# Patient Record
(demographics unavailable — no encounter records)

---

## 2017-04-20 NOTE — XRAY REPORT
LEFT TOES:



History: Fifth toe pain.



The bony architecture is intact.  Bony alignment is normal.

No soft tissue abnormalities are seen.  The joint spaces appear

preserved.



IMPRESSION:

Normal left toes.

## 2017-04-21 NOTE — EMERGENCY DEPARTMENT REPORT
Entered by JYOTI ORLANDO, acting as scribe for EDDIE MURRAY NP.





ED Lower Extremity HPI





- General


Chief Complaint: Extremity Injury, Lower


Stated Complaint: PAIN IN TOES


Source: patient


Mode of arrival: Ambulatory


Limitations: No Limitations





- History of Present Illness


Initial Comments: 


36 year old female with no significant PMHx presents to the ED c/o of left 

pinky toe pain that began this morning. Patient states that she was walking and 

subsequently kicked her toe straight against a coffee table. Rates pain a 10/10 

in severity. Pain is worsened with standing and walking. Denies loss of 

consciousness, numbness, tingling, chest pain, SOB, and nausea. Patient was 

brought to the ED by her sister. LMP 11/24/2016. ATA SOLOMON Complaint: foot injury (straight left pinky toe impact against a coffee table

)


-: This morning


Injury: Toes: Left (pinky toes)


Type of Injury: other (straight left toe impact against a coffee table)


Place: home


Severity: moderate


Severity scale (0 -10): 10


Improves With: immobilization


Worsens With: weight bearing (standing)


Context: walking (and kicked toes against coffe table)


Associated Symptoms: able to partially bear weight, ambulatory.  denies: snap/

pop sensation, swelling, numbness, tingling





- Related Data


 Previous Rx's











 Medication  Instructions  Recorded  Last Taken  Type


 


HYDROcodone/APAP 5-325 [[place] 1 each PO Q6HR PRN #30 tablet 08/14/16 Unknown Rx





5/325]    


 


Ibuprofen [Motrin] 800 mg PO Q8HR PRN #60 tablet 08/14/16 Unknown Rx


 


traMADol [Ultram] 50 mg PO Q4HR PRN 5 Days 04/20/17 Unknown Rx











 Allergies











Allergy/AdvReac Type Severity Reaction Status Date / Time


 


No Known Allergies Allergy   Verified 05/02/16 16:13














ED Review of Systems


Comment: All other systems reviewed and negative


Constitutional: denies: chills, weakness (generalized), other (tingling and 

loss of consciousness)


Eyes: denies: eye pain, eye discharge, vision change


ENT: denies: ear pain, throat pain


Respiratory: denies: orthopnea, shortness of breath, SOB with exertion, SOB at 

rest


Cardiovascular: denies: chest pain


Gastrointestinal: denies: nausea


Musculoskeletal: other (left pinky toe pain)


Skin: denies: rash, lesions


Neurological: denies: numbness


Psychiatric: denies: anxiety, depression





ED Past Medical Hx





- Past Medical History


Previous Medical History?: Yes


Hx Hypertension: No


Hx Congestive Heart Failure: No


Hx Diabetes: No


Hx Deep Vein Thrombosis: No


Hx Renal Disease: No


Hx Sickle Cell Disease: No


Hx Seizures: No


Hx Asthma: No


Hx COPD: No


Hx HIV: No


Additional medical history: ovarian cyst





- Surgical History


Past Surgical History?: Yes


Additional Surgical History: hernia surg





- Social History


Smoking Status: Never Smoker


Substance Use Type: None





- Medications


Home Medications: 


 Home Medications











 Medication  Instructions  Recorded  Confirmed  Last Taken  Type


 


HYDROcodone/APAP 5-325 [[place] 1 each PO Q6HR PRN #30 tablet 08/14/16  Unknown Rx





5/325]     


 


Ibuprofen [Motrin] 800 mg PO Q8HR PRN #60 tablet 08/14/16  Unknown Rx


 


traMADol [Ultram] 50 mg PO Q4HR PRN 5 Days 04/20/17  Unknown Rx














ED Physical Exam





- General


Limitations: No Limitations


General appearance: alert, in no apparent distress





- Head


Head exam: Present: atraumatic, normocephalic





- Eye


Eye exam: Present: normal appearance, EOMI


Pupils: Present: normal accommodation





- ENT


ENT exam: Present: normal exam, mucous membranes moist, TM's normal bilaterally





- Neck


Neck exam: Present: normal inspection, full ROM





- Respiratory


Respiratory exam: Present: normal lung sounds bilaterally.  Absent: respiratory 

distress





- Cardiovascular


Cardiovascular Exam: Present: regular rate, normal rhythm





- GI/Abdominal


GI/Abdominal exam: Present: soft.  Absent: distended





- Extremities Exam


Extremities exam: Present: normal inspection, full ROM, tenderness (left pinky 

toe), normal capillary refill.  Absent: pedal edema, joint swelling (left pinky 

toe and left lateral foot)





- Expanded Lower Extremity Exam


  ** Left


Hip exam: Present: normal inspection, full ROM.  Absent: tenderness


Upper Leg exam: Present: normal inspection, full ROM.  Absent: tenderness


Knee exam: Present: normal inspection, full ROM.  Absent: tenderness


Lower Leg exam: Present: normal inspection, full ROM.  Absent: tenderness


Ankle exam: Present: normal inspection, full ROM.  Absent: tenderness


Foot/Toe exam: Present: normal inspection, full ROM, tenderness (left pinky toe)

.  Absent: swelling, abrasion, laceration, ecchymosis, deformity, erythema


Neuro vascular tendon exam: Present: no vascular compromise.  Absent: pulse 

deficit, abnormal cap refill, motor deficit, sensory deficit, tendon deficit, 

pallor


Gait: Positive: antalgic





- Back Exam


Back exam: Present: normal inspection, full ROM





- Neurological Exam


Neurological exam: Present: alert, oriented X3





- Psychiatric


Psychiatric exam: Present: normal affect, normal mood





- Skin


Skin exam: Present: warm, dry, intact.  Absent: rash, erythema (left pinky toe)

, ecchymosis, other (left pinky toe edema)





ED Course


 Vital Signs











  04/20/17 04/20/17





  12:23 16:55


 


Temperature 98.6 F 98 F


 


Pulse Rate 64 76


 


Respiratory 16 18





Rate  


 


Blood Pressure 143/100 


 


Blood Pressure  151/89





[Right]  


 


O2 Sat by Pulse 100 100





Oximetry  














 Vital Signs











  04/20/17 04/20/17





  12:23 16:55


 


Temperature 98.6 F 98 F


 


Pulse Rate 64 76


 


Respiratory 16 18





Rate  


 


Blood Pressure 143/100 


 


Blood Pressure  151/89





[Right]  


 


O2 Sat by Pulse 100 100





Oximetry  














ED Lower Extremity MDM





- Medical Decision Making





Ed course: This is a 36-year-old female that presents with thank you for injury 

to the left.


1- x-ray of the left toe read by Dr. Masters.  Normal left toes.  No fracture 

noted.


2- I prescribed Ultram by mouth for pain.


3- Ace wrap to the left foot.


4- I instructed the patient to follow-up with orthopedic doctor in 3-5 days.


5- at the time of discharge the patient does not seem toxic or ill appearance.  

No further questions for the patient noted.


6- patient agrees to discharge planning treatment.


7- I instructed for the patient to RICE








ED Disposition


Clinical Impression: 


 Contusion of toe, left





Disposition: DISCHARGED TO HOME OR SELFCARE


Is pt being admited?: No


Does the pt Need Aspirin: No


Condition: Stable


Instructions:  Tramadol (By mouth), RICE Therapy (ED)


Additional Instructions: 


Follow-up with orthopedic doctor in 3-5 days.


Do not use any heavy machinery or drink alcohol while taking Ultram.


If symptoms worsen please report back to emergency room.


Rest, elevate, ice to affected area.


Prescriptions: 


traMADol [Ultram] 50 mg PO Q4HR PRN 5 Days


 PRN Reason: Pain


Referrals: 


DR FRANCO [Other] - 3-5 Days


RADHA CHOW MD [Staff Physician] - 3-5 Days


[facility] [Outside] - 3-5 Days


[facility] [Outside] - 3-5 Days


Forms:  Work/School Release Form(ED)





This documentation as recorded by the DARION quick JASMINE,accurately 

reflects the service I personally performed and the decisions made by me,EDDIE MURRAY, NP.

## 2019-04-29 NOTE — EMERGENCY DEPARTMENT REPORT
ED Female  HPI





- General


Chief complaint: Pain General


Stated complaint: BACK PAIN


Time Seen by Provider: 19 12:20


Source: patient


Mode of arrival: Ambulatory


Limitations: No Limitations





- History of Present Illness


Initial comments: 





This is a 38-year-old American female who presents with left breast pain for 3-4

months radiating to her back.  Patient is 15 weeks gestation,  A0.  Patient

states she had ultrasound and mammogram last year by OB/GYN when no definitive 

diagnosis.  She also reports some itching under her left breast.  Patient states

she spoke with her OB/GYN earlier today who called and cream which is waiting at

[facility] for pickup.  She denies chest pain, palpitations, shortness of 

breath, nausea or vomiting, dizziness.


MD Complaint: other (left breast pain)


Onset/Timin


-: month(s)


Radiation: other (back)


Severity: mild


Severity scale (0 -10): 7


Quality: aching


Consistency: intermittent


Improves with: none


Worsens with: none


Are you Pregnant Now?: Yes (15 weeks gestation)


Last Menstrual Period: 19


EDC: 19


Associated Symptoms: denies other symptoms





- Related Data


Sexually active: Yes


: 5


Para: 4


A: 0


                                  Previous Rx's











 Medication  Instructions  Recorded  Last Taken  Type


 


HYDROcodone/APAP 5-325 [[place] 1 each PO Q6HR PRN #30 tablet 16 Unknown Rx





5/325]    


 


Ibuprofen [Motrin] 800 mg PO Q8HR PRN #60 tablet 16 Unknown Rx


 


traMADol [Ultram] 50 mg PO Q4HR PRN 5 Days  tablet 17 Unknown Rx











                                    Allergies











Allergy/AdvReac Type Severity Reaction Status Date / Time


 


No Known Allergies Allergy   Verified 19 11:35














ED Review of Systems


ROS: 


Stated complaint: BACK PAIN


Other details as noted in HPI





Constitutional: denies: chills, fever


Respiratory: denies: cough, shortness of breath, wheezing


Cardiovascular: denies: chest pain, palpitations


Gastrointestinal: denies: abdominal pain, nausea, diarrhea


Genitourinary: denies: urgency, dysuria, discharge


Musculoskeletal: other (left breast pain).  denies: back pain, joint swelling, 

arthralgia


Skin: denies: rash, lesions


Neurological: denies: headache, weakness, paresthesias


Psychiatric: denies: anxiety, depression





ED Past Medical Hx





- Past Medical History


Previous Medical History?: Yes


Hx Hypertension: No


Hx Congestive Heart Failure: No


Hx Diabetes: No


Hx Deep Vein Thrombosis: No


Hx Renal Disease: No


Hx Sickle Cell Disease: No


Hx Seizures: No


Hx Asthma: No


Hx COPD: No


Hx HIV: No


Additional medical history: ovarian cyst





- Surgical History


Past Surgical History?: Yes


Additional Surgical History: hernia surg





- Social History


Smoking Status: Never Smoker


Substance Use Type: None





- Medications


Home Medications: 


                                Home Medications











 Medication  Instructions  Recorded  Confirmed  Last Taken  Type


 


HYDROcodone/APAP 5-325 [[place] 1 each PO Q6HR PRN #30 tablet 16  Unknown Rx





5/325]     


 


Ibuprofen [Motrin] 800 mg PO Q8HR PRN #60 tablet 16  Unknown Rx


 


traMADol [Ultram] 50 mg PO Q4HR PRN 5 Days  tablet 17  Unknown Rx














ED Physical Exam





- General


Limitations: No Limitations


General appearance: alert, in no apparent distress





- Respiratory


Respiratory exam: Present: normal lung sounds bilaterally.  Absent: respiratory 

distress





- Cardiovascular


Cardiovascular Exam: Present: regular rate, normal rhythm.  Absent: systolic 

murmur, diastolic murmur, rubs, gallop





- GI/Abdominal


GI/Abdominal exam: Present: soft, normal bowel sounds





- Neurological Exam


Neurological exam: Present: alert, oriented X3





- Psychiatric


Psychiatric exam: Present: normal affect, normal mood





- Skin


Skin exam: Present: warm, dry, intact, normal color.  Absent: rash





- Other


Other exam information: 





Breast:  Tenderness bilaterally.  No chest deformity, asymmetry, normal 

contours.  No nodules, masses, dimpling, or discharge.





ED Course


                                   Vital Signs











  19





  12:20


 


Temperature 98.5 F


 


Pulse Rate 97 H


 


Respiratory 18





Rate 


 


Blood Pressure 155/88


 


O2 Sat by Pulse 99





Oximetry 














ED Medical Decision Making





- Lab Data








                                   Lab Results











  19 Range/Units





  12:59 


 


Urine Color  Yellow  (Yellow)  


 


Urine Turbidity  Slightly-cloudy  (Clear)  


 


Urine pH  6.0  (5.0-7.0)  


 


Ur Specific Gravity  1.025  (1.003-1.030)  


 


Urine Protein  <15 mg/dl  (Negative)  mg/dL


 


Urine Glucose (UA)  50  (Negative)  mg/dL


 


Urine Ketones  Neg  (Negative)  mg/dL


 


Urine Blood  Neg  (Negative)  


 


Urine Nitrite  Neg  (Negative)  


 


Urine Bilirubin  Neg  (Negative)  


 


Urine Urobilinogen  < 2.0  (<2.0)  mg/dL


 


Ur Leukocyte Esterase  Tr  (Negative)  


 


Urine WBC (Auto)  3.0  (0.0-6.0)  /HPF


 


Urine RBC (Auto)  7.0  (0.0-6.0)  /HPF


 


U Epithel Cells (Auto)  3.0  (0-13.0)  /HPF


 


Urine Bacteria (Auto)  1+  (Negative)  /HPF


 


Urine Mucus  3+  /HPF














- Medical Decision Making





Patient was examined by me.  Vitals are normal and patient is in no acute 

distress.  A breast exam was performed and patient is tender on left without 

signs of infection.  Patient is 15 weeks gestation.  Instructed to follow up 

with OB/GYN for further evaluation or imaging.  Patient informed of results. Her

 OB/GYN called in a prescription for cream to apply under breast for irritation.

  She will  prescription today.  Patient discharged home stable.  Follow 

up with PCP in 2-3 days.


Critical care attestation.: 


If time is entered above; I have spent that time in minutes in the direct care 

of this critically ill patient, excluding procedure time.








ED Disposition


Clinical Impression: 


 Breast pain, left





Fibrocystic breast changes


Qualifiers:


 Laterality: left Qualified Code(s): N60.12 - Diffuse cystic mastopathy of left 

breast





Disposition: DC-01 TO HOME OR SELFCARE


Is pt being admited?: No


Does the pt Need Aspirin: No


Condition: Stable


Additional Instructions: 


On the left with an OB/GYN for continued care.  Take Tylenol every 6-8 hours as 

needed for pain.


Referrals: 


OLEG TOVAR MD [Primary Care Provider] - 3-5 Days


[facility] [Provider Group] - 3-5 Days


[facility] [Provider Group] - 3-5 Days


Time of Disposition: 15:32

## 2019-04-29 NOTE — EMERGENCY DEPARTMENT REPORT
Chief Complaint: Pain General


Stated Complaint: BACK PAIN


Time Seen by Provider: 19 12:20





- HPI


History of Present Illness: 





pt presents with upper back pain x 3 months


also left breast pain x 3 months, has itching, was given cream by OB/GYN states 

it is not working 


had a mammogram last year 


no fall, injury, or trauma


no numbness or weakness


pt is currently 15 weeks pregnant 


/P:4/A:0








no PMHx 


no daily meds





non smoker


non drinker


no drug use 





MSE screening note: 


Focused history performed 


UA ordered 











ED Disposition for MSE


Condition: Stable